# Patient Record
Sex: MALE | Race: WHITE | NOT HISPANIC OR LATINO | Employment: FULL TIME | URBAN - METROPOLITAN AREA
[De-identification: names, ages, dates, MRNs, and addresses within clinical notes are randomized per-mention and may not be internally consistent; named-entity substitution may affect disease eponyms.]

---

## 2022-06-22 ENCOUNTER — OFFICE VISIT (OUTPATIENT)
Dept: FAMILY MEDICINE | Facility: CLINIC | Age: 22
End: 2022-06-22
Payer: COMMERCIAL

## 2022-06-22 VITALS
BODY MASS INDEX: 42.66 KG/M2 | RESPIRATION RATE: 18 BRPM | SYSTOLIC BLOOD PRESSURE: 126 MMHG | OXYGEN SATURATION: 100 % | DIASTOLIC BLOOD PRESSURE: 88 MMHG | HEART RATE: 97 BPM | HEIGHT: 72 IN | WEIGHT: 315 LBS

## 2022-06-22 DIAGNOSIS — J30.2 SEASONAL ALLERGIC RHINITIS, UNSPECIFIED TRIGGER: Primary | ICD-10-CM

## 2022-06-22 PROCEDURE — 3079F PR MOST RECENT DIASTOLIC BLOOD PRESSURE 80-89 MM HG: ICD-10-PCS | Mod: CPTII,,, | Performed by: FAMILY MEDICINE

## 2022-06-22 PROCEDURE — 99203 OFFICE O/P NEW LOW 30 MIN: CPT | Mod: ,,, | Performed by: FAMILY MEDICINE

## 2022-06-22 PROCEDURE — 99203 PR OFFICE/OUTPT VISIT, NEW, LEVL III, 30-44 MIN: ICD-10-PCS | Mod: ,,, | Performed by: FAMILY MEDICINE

## 2022-06-22 PROCEDURE — 3008F PR BODY MASS INDEX (BMI) DOCUMENTED: ICD-10-PCS | Mod: CPTII,,, | Performed by: FAMILY MEDICINE

## 2022-06-22 PROCEDURE — 3074F SYST BP LT 130 MM HG: CPT | Mod: CPTII,,, | Performed by: FAMILY MEDICINE

## 2022-06-22 PROCEDURE — 3008F BODY MASS INDEX DOCD: CPT | Mod: CPTII,,, | Performed by: FAMILY MEDICINE

## 2022-06-22 PROCEDURE — 1160F RVW MEDS BY RX/DR IN RCRD: CPT | Mod: CPTII,,, | Performed by: FAMILY MEDICINE

## 2022-06-22 PROCEDURE — 3079F DIAST BP 80-89 MM HG: CPT | Mod: CPTII,,, | Performed by: FAMILY MEDICINE

## 2022-06-22 PROCEDURE — 1159F PR MEDICATION LIST DOCUMENTED IN MEDICAL RECORD: ICD-10-PCS | Mod: CPTII,,, | Performed by: FAMILY MEDICINE

## 2022-06-22 PROCEDURE — 1160F PR REVIEW ALL MEDS BY PRESCRIBER/CLIN PHARMACIST DOCUMENTED: ICD-10-PCS | Mod: CPTII,,, | Performed by: FAMILY MEDICINE

## 2022-06-22 PROCEDURE — 3074F PR MOST RECENT SYSTOLIC BLOOD PRESSURE < 130 MM HG: ICD-10-PCS | Mod: CPTII,,, | Performed by: FAMILY MEDICINE

## 2022-06-22 PROCEDURE — 1159F MED LIST DOCD IN RCRD: CPT | Mod: CPTII,,, | Performed by: FAMILY MEDICINE

## 2022-06-22 RX ORDER — CETIRIZINE HYDROCHLORIDE 10 MG/1
10 TABLET ORAL DAILY
COMMUNITY
End: 2022-07-21 | Stop reason: SDUPTHER

## 2022-06-22 RX ORDER — FLUTICASONE PROPIONATE 50 MCG
1 SPRAY, SUSPENSION (ML) NASAL DAILY
COMMUNITY

## 2022-06-22 RX ORDER — MONTELUKAST SODIUM 10 MG/1
10 TABLET ORAL NIGHTLY
Qty: 30 TABLET | Refills: 3 | Status: SHIPPED | OUTPATIENT
Start: 2022-06-22 | End: 2022-07-21 | Stop reason: SDUPTHER

## 2022-06-22 RX ORDER — PSEUDOEPHEDRINE HCL 120 MG/1
120 TABLET, FILM COATED, EXTENDED RELEASE ORAL
COMMUNITY

## 2022-06-22 NOTE — PROGRESS NOTES
"Subjective:       Patient ID: Roney Fuller is a 22 y.o. male.    Chief Complaint: Establish Care and allergies chronic      Allergies      Review of Systems   Constitutional: Negative.    HENT: Positive for nasal congestion and rhinorrhea.         Allergies: on medication for "years", worse during spring   Respiratory: Negative.    Cardiovascular: Negative.    Psychiatric/Behavioral: Negative.            Objective:      /88 (BP Location: Left arm, Patient Position: Sitting, BP Method: Large (Manual))   Pulse 97   Resp 18   Ht 6' (1.829 m)   Wt (!) 146.5 kg (323 lb)   SpO2 100%   BMI 43.81 kg/m²    Physical Exam  Constitutional:       General: He is not in acute distress.     Appearance: Normal appearance.   Cardiovascular:      Rate and Rhythm: Normal rate and regular rhythm.   Pulmonary:      Effort: Pulmonary effort is normal.      Breath sounds: Normal breath sounds.   Neurological:      Mental Status: He is alert.   Psychiatric:         Mood and Affect: Mood normal.         Behavior: Behavior normal.         Thought Content: Thought content normal.         Judgment: Judgment normal.             Assessment:       Problem List Items Addressed This Visit        ENT    Seasonal allergic rhinitis - Primary    Relevant Medications    montelukast (SINGULAIR) 10 mg tablet           Plan:     1. Allergic rhinitis  Continue Zyrtec/OTC medication  Rx for singular 10 mg q.p.m.  Monitor  Discussed allergy testing; readdress at next visit  Return    "

## 2022-07-12 DIAGNOSIS — Z13.6 SCREENING FOR CARDIOVASCULAR CONDITION: ICD-10-CM

## 2022-07-12 DIAGNOSIS — Z00.00 WELLNESS EXAMINATION: Primary | ICD-10-CM

## 2022-07-12 DIAGNOSIS — Z11.59 NEED FOR HEPATITIS C SCREENING TEST: ICD-10-CM

## 2022-07-12 DIAGNOSIS — Z11.4 ENCOUNTER FOR SCREENING FOR HIV: ICD-10-CM

## 2022-07-15 ENCOUNTER — LAB VISIT (OUTPATIENT)
Dept: LAB | Facility: HOSPITAL | Age: 22
End: 2022-07-15
Attending: FAMILY MEDICINE
Payer: COMMERCIAL

## 2022-07-15 DIAGNOSIS — Z13.6 SCREENING FOR CARDIOVASCULAR CONDITION: ICD-10-CM

## 2022-07-15 DIAGNOSIS — Z00.00 WELLNESS EXAMINATION: ICD-10-CM

## 2022-07-15 DIAGNOSIS — Z11.59 NEED FOR HEPATITIS C SCREENING TEST: ICD-10-CM

## 2022-07-15 DIAGNOSIS — Z11.4 ENCOUNTER FOR SCREENING FOR HIV: ICD-10-CM

## 2022-07-15 LAB
ALBUMIN SERPL-MCNC: 4.6 GM/DL (ref 3.5–5)
ALBUMIN/GLOB SERPL: 1.5 RATIO (ref 1.1–2)
ALP SERPL-CCNC: 63 UNIT/L (ref 40–150)
ALT SERPL-CCNC: 36 UNIT/L (ref 0–55)
AST SERPL-CCNC: 28 UNIT/L (ref 5–34)
BASOPHILS # BLD AUTO: 0.03 X10(3)/MCL (ref 0–0.2)
BASOPHILS NFR BLD AUTO: 0.4 %
BILIRUBIN DIRECT+TOT PNL SERPL-MCNC: 1.3 MG/DL
BUN SERPL-MCNC: 12 MG/DL (ref 8.9–20.6)
CALCIUM SERPL-MCNC: 9.4 MG/DL (ref 8.4–10.2)
CHLORIDE SERPL-SCNC: 105 MMOL/L (ref 98–107)
CHOLEST SERPL-MCNC: 163 MG/DL
CHOLEST/HDLC SERPL: 5 {RATIO} (ref 0–5)
CO2 SERPL-SCNC: 26 MMOL/L (ref 22–29)
CREAT SERPL-MCNC: 1.15 MG/DL (ref 0.73–1.18)
EOSINOPHIL # BLD AUTO: 0.15 X10(3)/MCL (ref 0–0.9)
EOSINOPHIL NFR BLD AUTO: 1.9 %
ERYTHROCYTE [DISTWIDTH] IN BLOOD BY AUTOMATED COUNT: 12.9 % (ref 11.5–17)
GLOBULIN SER-MCNC: 3.1 GM/DL (ref 2.4–3.5)
GLUCOSE SERPL-MCNC: 102 MG/DL (ref 74–100)
HCT VFR BLD AUTO: 43.2 % (ref 42–52)
HCV AB SERPL QL IA: NONREACTIVE
HDLC SERPL-MCNC: 36 MG/DL (ref 35–60)
HGB BLD-MCNC: 14.8 GM/DL (ref 14–18)
HIV 1+2 AB+HIV1 P24 AG SERPL QL IA: NONREACTIVE
IMM GRANULOCYTES # BLD AUTO: 0.02 X10(3)/MCL (ref 0–0.04)
IMM GRANULOCYTES NFR BLD AUTO: 0.3 %
LDLC SERPL CALC-MCNC: 75 MG/DL (ref 50–140)
LYMPHOCYTES # BLD AUTO: 1.49 X10(3)/MCL (ref 0.6–4.6)
LYMPHOCYTES NFR BLD AUTO: 19.3 %
MCH RBC QN AUTO: 29.6 PG (ref 27–31)
MCHC RBC AUTO-ENTMCNC: 34.3 MG/DL (ref 33–36)
MCV RBC AUTO: 86.4 FL (ref 80–94)
MONOCYTES # BLD AUTO: 0.51 X10(3)/MCL (ref 0.1–1.3)
MONOCYTES NFR BLD AUTO: 6.6 %
NEUTROPHILS # BLD AUTO: 5.5 X10(3)/MCL (ref 2.1–9.2)
NEUTROPHILS NFR BLD AUTO: 71.5 %
NRBC BLD AUTO-RTO: 0 %
PLATELET # BLD AUTO: 330 X10(3)/MCL (ref 130–400)
PMV BLD AUTO: 9.7 FL (ref 7.4–10.4)
POTASSIUM SERPL-SCNC: 3.8 MMOL/L (ref 3.5–5.1)
PROT SERPL-MCNC: 7.7 GM/DL (ref 6.4–8.3)
RBC # BLD AUTO: 5 X10(6)/MCL (ref 4.7–6.1)
SODIUM SERPL-SCNC: 143 MMOL/L (ref 136–145)
TRIGL SERPL-MCNC: 259 MG/DL (ref 34–140)
VLDLC SERPL CALC-MCNC: 52 MG/DL
WBC # SPEC AUTO: 7.7 X10(3)/MCL (ref 4.5–11.5)

## 2022-07-15 PROCEDURE — 80053 COMPREHEN METABOLIC PANEL: CPT

## 2022-07-15 PROCEDURE — 85025 COMPLETE CBC W/AUTO DIFF WBC: CPT

## 2022-07-15 PROCEDURE — 87389 HIV-1 AG W/HIV-1&-2 AB AG IA: CPT

## 2022-07-15 PROCEDURE — 80061 LIPID PANEL: CPT

## 2022-07-15 PROCEDURE — 36415 COLL VENOUS BLD VENIPUNCTURE: CPT

## 2022-07-15 PROCEDURE — 86803 HEPATITIS C AB TEST: CPT

## 2022-07-21 ENCOUNTER — OFFICE VISIT (OUTPATIENT)
Dept: FAMILY MEDICINE | Facility: CLINIC | Age: 22
End: 2022-07-21
Payer: COMMERCIAL

## 2022-07-21 VITALS
WEIGHT: 315 LBS | BODY MASS INDEX: 41.75 KG/M2 | DIASTOLIC BLOOD PRESSURE: 80 MMHG | HEIGHT: 73 IN | SYSTOLIC BLOOD PRESSURE: 128 MMHG

## 2022-07-21 DIAGNOSIS — J30.2 SEASONAL ALLERGIC RHINITIS, UNSPECIFIED TRIGGER: ICD-10-CM

## 2022-07-21 DIAGNOSIS — Z00.00 WELLNESS EXAMINATION: Primary | ICD-10-CM

## 2022-07-21 PROCEDURE — 1159F PR MEDICATION LIST DOCUMENTED IN MEDICAL RECORD: ICD-10-PCS | Mod: CPTII,,, | Performed by: FAMILY MEDICINE

## 2022-07-21 PROCEDURE — 99395 PR PREVENTIVE VISIT,EST,18-39: ICD-10-PCS | Mod: ,,, | Performed by: FAMILY MEDICINE

## 2022-07-21 PROCEDURE — 3008F PR BODY MASS INDEX (BMI) DOCUMENTED: ICD-10-PCS | Mod: CPTII,,, | Performed by: FAMILY MEDICINE

## 2022-07-21 PROCEDURE — 1160F RVW MEDS BY RX/DR IN RCRD: CPT | Mod: CPTII,,, | Performed by: FAMILY MEDICINE

## 2022-07-21 PROCEDURE — 3008F BODY MASS INDEX DOCD: CPT | Mod: CPTII,,, | Performed by: FAMILY MEDICINE

## 2022-07-21 PROCEDURE — 1160F PR REVIEW ALL MEDS BY PRESCRIBER/CLIN PHARMACIST DOCUMENTED: ICD-10-PCS | Mod: CPTII,,, | Performed by: FAMILY MEDICINE

## 2022-07-21 PROCEDURE — 99395 PREV VISIT EST AGE 18-39: CPT | Mod: ,,, | Performed by: FAMILY MEDICINE

## 2022-07-21 PROCEDURE — 3079F PR MOST RECENT DIASTOLIC BLOOD PRESSURE 80-89 MM HG: ICD-10-PCS | Mod: CPTII,,, | Performed by: FAMILY MEDICINE

## 2022-07-21 PROCEDURE — 3074F SYST BP LT 130 MM HG: CPT | Mod: CPTII,,, | Performed by: FAMILY MEDICINE

## 2022-07-21 PROCEDURE — 1159F MED LIST DOCD IN RCRD: CPT | Mod: CPTII,,, | Performed by: FAMILY MEDICINE

## 2022-07-21 PROCEDURE — 3074F PR MOST RECENT SYSTOLIC BLOOD PRESSURE < 130 MM HG: ICD-10-PCS | Mod: CPTII,,, | Performed by: FAMILY MEDICINE

## 2022-07-21 PROCEDURE — 3079F DIAST BP 80-89 MM HG: CPT | Mod: CPTII,,, | Performed by: FAMILY MEDICINE

## 2022-07-21 RX ORDER — MONTELUKAST SODIUM 10 MG/1
10 TABLET ORAL NIGHTLY
Qty: 30 TABLET | Refills: 3 | Status: SHIPPED | OUTPATIENT
Start: 2022-07-21 | End: 2022-11-18

## 2022-07-21 RX ORDER — ACETAMINOPHEN 325 MG/1
325 TABLET ORAL EVERY 6 HOURS PRN
COMMUNITY

## 2022-07-21 RX ORDER — CETIRIZINE HYDROCHLORIDE 10 MG/1
10 TABLET ORAL DAILY
Qty: 30 TABLET | Refills: 3 | Status: SHIPPED | OUTPATIENT
Start: 2022-07-21 | End: 2022-11-29

## 2022-07-21 NOTE — PROGRESS NOTES
"Subjective:       Patient ID: Roney Fuller is a 22 y.o. male.    Chief Complaint: Follow-up (Follow up wellness /Room 3)      Wellness      Review of Systems   Constitutional: Negative.  Negative for chills and fever.   HENT: Positive for nasal congestion (reports medication no longer working as well) and sinus pressure/congestion. Negative for rhinorrhea.    Respiratory: Negative.    Cardiovascular: Negative.    Gastrointestinal: Negative.    Genitourinary: Negative.    Musculoskeletal: Negative.    Integumentary:  Negative.   Neurological: Negative.    Hematological: Negative.    Psychiatric/Behavioral: Negative.            Objective:      /80 (BP Location: Left arm, Patient Position: Sitting, BP Method: Large (Manual))   Ht 6' 1" (1.854 m)   Wt (!) 148.8 kg (328 lb)   BMI 43.27 kg/m²    Physical Exam  Constitutional:       General: He is not in acute distress.     Appearance: Normal appearance.   Cardiovascular:      Rate and Rhythm: Normal rate and regular rhythm.   Pulmonary:      Effort: Pulmonary effort is normal.      Breath sounds: Normal breath sounds.   Abdominal:      General: Abdomen is flat. Bowel sounds are normal.      Palpations: Abdomen is soft.   Musculoskeletal:         General: Normal range of motion.   Neurological:      General: No focal deficit present.      Mental Status: He is alert and oriented to person, place, and time.   Psychiatric:         Mood and Affect: Mood normal.         Behavior: Behavior normal.         Thought Content: Thought content normal.         Judgment: Judgment normal.           Recent Results (from the past 504 hour(s))   Comprehensive Metabolic Panel    Collection Time: 07/15/22  1:13 PM   Result Value Ref Range    Sodium Level 143 136 - 145 mmol/L    Potassium Level 3.8 3.5 - 5.1 mmol/L    Chloride 105 98 - 107 mmol/L    Carbon Dioxide 26 22 - 29 mmol/L    Glucose Level 102 (H) 74 - 100 mg/dL    Blood Urea Nitrogen 12.0 8.9 - 20.6 mg/dL    Creatinine " 1.15 0.73 - 1.18 mg/dL    Calcium Level Total 9.4 8.4 - 10.2 mg/dL    Protein Total 7.7 6.4 - 8.3 gm/dL    Albumin Level 4.6 3.5 - 5.0 gm/dL    Globulin 3.1 2.4 - 3.5 gm/dL    Albumin/Globulin Ratio 1.5 1.1 - 2.0 ratio    Bilirubin Total 1.3 <=1.5 mg/dL    Alkaline Phosphatase 63 40 - 150 unit/L    Alanine Aminotransferase 36 0 - 55 unit/L    Aspartate Aminotransferase 28 5 - 34 unit/L    Estimated GFR-Non  >60 mls/min/1.73/m2   Hepatitis C Antibody    Collection Time: 07/15/22  1:13 PM   Result Value Ref Range    Hepatitis C Antibody Nonreactive Nonreactive   HIV 1/2 Ag/Ab (4th Gen)    Collection Time: 07/15/22  1:13 PM   Result Value Ref Range    HIV Nonreactive Nonreactive   Lipid Panel    Collection Time: 07/15/22  1:13 PM   Result Value Ref Range    Cholesterol Total 163 <=200 mg/dL    HDL Cholesterol 36 35 - 60 mg/dL    Triglyceride 259 (H) 34 - 140 mg/dL    Cholesterol/HDL Ratio 5 0 - 5    Very Low Density Lipoprotein 52     LDL Cholesterol 75.00 50.00 - 140.00 mg/dL   CBC with Differential    Collection Time: 07/15/22  1:13 PM   Result Value Ref Range    WBC 7.7 4.5 - 11.5 x10(3)/mcL    RBC 5.00 4.70 - 6.10 x10(6)/mcL    Hgb 14.8 14.0 - 18.0 gm/dL    Hct 43.2 42.0 - 52.0 %    MCV 86.4 80.0 - 94.0 fL    MCH 29.6 27.0 - 31.0 pg    MCHC 34.3 33.0 - 36.0 mg/dL    RDW 12.9 11.5 - 17.0 %    Platelet 330 130 - 400 x10(3)/mcL    MPV 9.7 7.4 - 10.4 fL    Neut % 71.5 %    Lymph % 19.3 %    Mono % 6.6 %    Eos % 1.9 %    Basophil % 0.4 %    Lymph # 1.49 0.6 - 4.6 x10(3)/mcL    Neut # 5.5 2.1 - 9.2 x10(3)/mcL    Mono # 0.51 0.1 - 1.3 x10(3)/mcL    Eos # 0.15 0 - 0.9 x10(3)/mcL    Baso # 0.03 0 - 0.2 x10(3)/mcL    IG# 0.02 0 - 0.04 x10(3)/mcL    IG% 0.3 %    NRBC% 0.0 %        Assessment:       Problem List Items Addressed This Visit        ENT    Seasonal allergic rhinitis    Relevant Medications    montelukast (SINGULAIR) 10 mg tablet    cetirizine (ZYRTEC) 10 MG tablet    Other Relevant Orders     Ambulatory referral/consult to ENT      Other Visit Diagnoses     Wellness examination    -  Primary           Plan:     1. Wellness  Lab work discussed with patient; diet modification she given to patient  Continue current medication  Continue diet/exercise  Refer patient to Dr. Gallagher for allergy workup  Return to clinic with any concerns

## 2023-01-20 ENCOUNTER — OFFICE VISIT (OUTPATIENT)
Dept: FAMILY MEDICINE | Facility: CLINIC | Age: 23
End: 2023-01-20
Payer: COMMERCIAL

## 2023-01-20 VITALS
BODY MASS INDEX: 41.75 KG/M2 | OXYGEN SATURATION: 100 % | HEART RATE: 88 BPM | WEIGHT: 315 LBS | SYSTOLIC BLOOD PRESSURE: 152 MMHG | HEIGHT: 73 IN | DIASTOLIC BLOOD PRESSURE: 100 MMHG | RESPIRATION RATE: 18 BRPM | TEMPERATURE: 97 F

## 2023-01-20 DIAGNOSIS — I10 HYPERTENSION, UNSPECIFIED TYPE: Primary | ICD-10-CM

## 2023-01-20 PROCEDURE — 3008F PR BODY MASS INDEX (BMI) DOCUMENTED: ICD-10-PCS | Mod: CPTII,,, | Performed by: FAMILY MEDICINE

## 2023-01-20 PROCEDURE — 3080F DIAST BP >= 90 MM HG: CPT | Mod: CPTII,,, | Performed by: FAMILY MEDICINE

## 2023-01-20 PROCEDURE — 99214 PR OFFICE/OUTPT VISIT, EST, LEVL IV, 30-39 MIN: ICD-10-PCS | Mod: ,,, | Performed by: FAMILY MEDICINE

## 2023-01-20 PROCEDURE — 1160F RVW MEDS BY RX/DR IN RCRD: CPT | Mod: CPTII,,, | Performed by: FAMILY MEDICINE

## 2023-01-20 PROCEDURE — 1159F PR MEDICATION LIST DOCUMENTED IN MEDICAL RECORD: ICD-10-PCS | Mod: CPTII,,, | Performed by: FAMILY MEDICINE

## 2023-01-20 PROCEDURE — 3008F BODY MASS INDEX DOCD: CPT | Mod: CPTII,,, | Performed by: FAMILY MEDICINE

## 2023-01-20 PROCEDURE — 3077F SYST BP >= 140 MM HG: CPT | Mod: CPTII,,, | Performed by: FAMILY MEDICINE

## 2023-01-20 PROCEDURE — 3077F PR MOST RECENT SYSTOLIC BLOOD PRESSURE >= 140 MM HG: ICD-10-PCS | Mod: CPTII,,, | Performed by: FAMILY MEDICINE

## 2023-01-20 PROCEDURE — 1159F MED LIST DOCD IN RCRD: CPT | Mod: CPTII,,, | Performed by: FAMILY MEDICINE

## 2023-01-20 PROCEDURE — 1160F PR REVIEW ALL MEDS BY PRESCRIBER/CLIN PHARMACIST DOCUMENTED: ICD-10-PCS | Mod: CPTII,,, | Performed by: FAMILY MEDICINE

## 2023-01-20 PROCEDURE — 99214 OFFICE O/P EST MOD 30 MIN: CPT | Mod: ,,, | Performed by: FAMILY MEDICINE

## 2023-01-20 PROCEDURE — 3080F PR MOST RECENT DIASTOLIC BLOOD PRESSURE >= 90 MM HG: ICD-10-PCS | Mod: CPTII,,, | Performed by: FAMILY MEDICINE

## 2023-01-20 RX ORDER — HYDROCHLOROTHIAZIDE 12.5 MG/1
12.5 TABLET ORAL DAILY
Qty: 30 TABLET | Refills: 1 | Status: SHIPPED | OUTPATIENT
Start: 2023-01-20 | End: 2023-02-02 | Stop reason: SDUPTHER

## 2023-01-20 NOTE — PROGRESS NOTES
"Subjective:       Patient ID: Roney Fuller is a 22 y.o. male.    Chief Complaint: Hypertension      Elevated BP  Scheduled for sinus surgery with Dr Gallagher earlier today. Noted to have elevated BP at pre-op. Reports headaches    Review of Systems   Constitutional: Negative.    Respiratory: Negative.     Cardiovascular: Negative.  Negative for chest pain and leg swelling.   Neurological:  Positive for headaches.         Objective:      BP (!) 152/100 (BP Location: Left arm, Patient Position: Sitting, BP Method: Large (Manual))   Pulse 88   Temp 97.1 °F (36.2 °C)   Resp 18   Ht 6' 1" (1.854 m)   Wt (!) 149.7 kg (330 lb)   SpO2 100%   BMI 43.54 kg/m²    Physical Exam  Constitutional:       Appearance: Normal appearance.   Cardiovascular:      Rate and Rhythm: Normal rate and regular rhythm.      Heart sounds: Normal heart sounds.   Pulmonary:      Effort: Pulmonary effort is normal.      Breath sounds: Normal breath sounds.   Neurological:      Mental Status: He is alert.   Psychiatric:         Mood and Affect: Mood normal.         Behavior: Behavior normal.         Thought Content: Thought content normal.         Judgment: Judgment normal.           Assessment:       Problem List Items Addressed This Visit    None  Visit Diagnoses       Hypertension, unspecified type    -  Primary    Relevant Medications    hydroCHLOROthiazide (HYDRODIURIL) 12.5 MG Tab               Plan:   1. Hypertension, unspecified type  -     hydroCHLOROthiazide (HYDRODIURIL) 12.5 MG Tab; Take 1 tablet (12.5 mg total) by mouth once daily.  Dispense: 30 tablet; Refill: 1  Start HCTZ 12.5 mg q.day   Monitor   discussed decrease sodium intake   Bring log to next visit   Return to clinic with any concerns      "

## 2023-02-02 ENCOUNTER — OFFICE VISIT (OUTPATIENT)
Dept: FAMILY MEDICINE | Facility: CLINIC | Age: 23
End: 2023-02-02
Payer: COMMERCIAL

## 2023-02-02 VITALS
WEIGHT: 315 LBS | SYSTOLIC BLOOD PRESSURE: 138 MMHG | HEIGHT: 73 IN | HEART RATE: 95 BPM | BODY MASS INDEX: 41.75 KG/M2 | OXYGEN SATURATION: 100 % | DIASTOLIC BLOOD PRESSURE: 88 MMHG | RESPIRATION RATE: 18 BRPM | TEMPERATURE: 98 F

## 2023-02-02 DIAGNOSIS — I10 HYPERTENSION, UNSPECIFIED TYPE: Primary | ICD-10-CM

## 2023-02-02 PROCEDURE — 3008F PR BODY MASS INDEX (BMI) DOCUMENTED: ICD-10-PCS | Mod: CPTII,,, | Performed by: FAMILY MEDICINE

## 2023-02-02 PROCEDURE — 3079F PR MOST RECENT DIASTOLIC BLOOD PRESSURE 80-89 MM HG: ICD-10-PCS | Mod: CPTII,,, | Performed by: FAMILY MEDICINE

## 2023-02-02 PROCEDURE — 99213 OFFICE O/P EST LOW 20 MIN: CPT | Mod: ,,, | Performed by: FAMILY MEDICINE

## 2023-02-02 PROCEDURE — 99213 PR OFFICE/OUTPT VISIT, EST, LEVL III, 20-29 MIN: ICD-10-PCS | Mod: ,,, | Performed by: FAMILY MEDICINE

## 2023-02-02 PROCEDURE — 1160F PR REVIEW ALL MEDS BY PRESCRIBER/CLIN PHARMACIST DOCUMENTED: ICD-10-PCS | Mod: CPTII,,, | Performed by: FAMILY MEDICINE

## 2023-02-02 PROCEDURE — 1159F MED LIST DOCD IN RCRD: CPT | Mod: CPTII,,, | Performed by: FAMILY MEDICINE

## 2023-02-02 PROCEDURE — 1160F RVW MEDS BY RX/DR IN RCRD: CPT | Mod: CPTII,,, | Performed by: FAMILY MEDICINE

## 2023-02-02 PROCEDURE — 3075F PR MOST RECENT SYSTOLIC BLOOD PRESS GE 130-139MM HG: ICD-10-PCS | Mod: CPTII,,, | Performed by: FAMILY MEDICINE

## 2023-02-02 PROCEDURE — 1159F PR MEDICATION LIST DOCUMENTED IN MEDICAL RECORD: ICD-10-PCS | Mod: CPTII,,, | Performed by: FAMILY MEDICINE

## 2023-02-02 PROCEDURE — 3008F BODY MASS INDEX DOCD: CPT | Mod: CPTII,,, | Performed by: FAMILY MEDICINE

## 2023-02-02 PROCEDURE — 3075F SYST BP GE 130 - 139MM HG: CPT | Mod: CPTII,,, | Performed by: FAMILY MEDICINE

## 2023-02-02 PROCEDURE — 3079F DIAST BP 80-89 MM HG: CPT | Mod: CPTII,,, | Performed by: FAMILY MEDICINE

## 2023-02-02 RX ORDER — HYDROCHLOROTHIAZIDE 25 MG/1
25 TABLET ORAL DAILY
Qty: 90 TABLET | Refills: 1 | Status: SHIPPED | OUTPATIENT
Start: 2023-02-02 | End: 2023-08-04

## 2023-02-02 NOTE — PROGRESS NOTES
"Subjective:       Patient ID: Roney Fuller is a 22 y.o. male.    Chief Complaint: sx clearance, 2 week follow up      Follow-up    Hypertension  - tolerating medication (no side effects)  - no headaches  - patient without any complaints    Review of Systems   Constitutional: Negative.    HENT:          Scheduled for surgery with Dr Gallagher   Respiratory: Negative.     Cardiovascular: Negative.    Neurological:  Negative for dizziness and headaches.         Objective:      /88 (BP Location: Left arm, Patient Position: Sitting, BP Method: Large (Manual))   Pulse 95   Temp 97.8 °F (36.6 °C)   Resp 18   Ht 6' 1" (1.854 m)   Wt (!) 151 kg (333 lb)   SpO2 100%   BMI 43.93 kg/m²    Physical Exam  Constitutional:       Appearance: Normal appearance.   Cardiovascular:      Rate and Rhythm: Normal rate and regular rhythm.      Heart sounds: Normal heart sounds.   Pulmonary:      Effort: Pulmonary effort is normal.      Breath sounds: Normal breath sounds.   Neurological:      Mental Status: He is alert.   Psychiatric:         Mood and Affect: Mood normal.         Behavior: Behavior normal.         Thought Content: Thought content normal.         Judgment: Judgment normal.           Assessment:       Problem List Items Addressed This Visit    None  Visit Diagnoses       Hypertension, unspecified type    -  Primary    Relevant Medications    hydroCHLOROthiazide (HYDRODIURIL) 25 MG tablet               Plan:   1. Hypertension, unspecified type  -     hydroCHLOROthiazide (HYDRODIURIL) 25 MG tablet; Take 1 tablet (25 mg total) by mouth once daily.  Dispense: 90 tablet; Refill: 1  Controlled  Continue current Rx medication  Return to clinic with any concerns  Keep scheduled appointment with Dr Gallagher  Patient cleared for surgery       "

## 2023-08-02 DIAGNOSIS — Z13.6 SCREENING FOR ISCHEMIC HEART DISEASE: ICD-10-CM

## 2023-08-02 DIAGNOSIS — Z11.4 ENCOUNTER FOR HIV (HUMAN IMMUNODEFICIENCY VIRUS) TEST: Primary | ICD-10-CM

## 2023-08-02 DIAGNOSIS — Z00.00 WELLNESS EXAMINATION: ICD-10-CM

## 2023-08-02 DIAGNOSIS — Z11.59 NEED FOR HEPATITIS C SCREENING TEST: ICD-10-CM

## 2023-08-25 ENCOUNTER — LAB VISIT (OUTPATIENT)
Dept: LAB | Facility: HOSPITAL | Age: 23
End: 2023-08-25
Attending: FAMILY MEDICINE
Payer: COMMERCIAL

## 2023-08-25 DIAGNOSIS — Z13.6 SCREENING FOR ISCHEMIC HEART DISEASE: ICD-10-CM

## 2023-08-25 DIAGNOSIS — Z11.59 NEED FOR HEPATITIS C SCREENING TEST: ICD-10-CM

## 2023-08-25 DIAGNOSIS — Z11.4 ENCOUNTER FOR HIV (HUMAN IMMUNODEFICIENCY VIRUS) TEST: ICD-10-CM

## 2023-08-25 DIAGNOSIS — Z00.00 WELLNESS EXAMINATION: ICD-10-CM

## 2023-08-25 LAB
ALBUMIN SERPL-MCNC: 4.1 G/DL (ref 3.5–5)
ALBUMIN/GLOB SERPL: 1.2 RATIO (ref 1.1–2)
ALP SERPL-CCNC: 53 UNIT/L (ref 40–150)
ALT SERPL-CCNC: 21 UNIT/L (ref 0–55)
AST SERPL-CCNC: 17 UNIT/L (ref 5–34)
BASOPHILS # BLD AUTO: 0.09 X10(3)/MCL
BASOPHILS NFR BLD AUTO: 1.4 %
BILIRUB SERPL-MCNC: 0.8 MG/DL
BUN SERPL-MCNC: 17 MG/DL (ref 8.9–20.6)
CALCIUM SERPL-MCNC: 9.5 MG/DL (ref 8.4–10.2)
CHLORIDE SERPL-SCNC: 108 MMOL/L (ref 98–107)
CHOLEST SERPL-MCNC: 185 MG/DL
CHOLEST/HDLC SERPL: 5 {RATIO} (ref 0–5)
CO2 SERPL-SCNC: 26 MMOL/L (ref 22–29)
CREAT SERPL-MCNC: 0.98 MG/DL (ref 0.73–1.18)
EOSINOPHIL # BLD AUTO: 0.24 X10(3)/MCL (ref 0–0.9)
EOSINOPHIL NFR BLD AUTO: 3.8 %
ERYTHROCYTE [DISTWIDTH] IN BLOOD BY AUTOMATED COUNT: 12.9 % (ref 11.5–17)
GFR SERPLBLD CREATININE-BSD FMLA CKD-EPI: >60 MLS/MIN/1.73/M2
GLOBULIN SER-MCNC: 3.3 GM/DL (ref 2.4–3.5)
GLUCOSE SERPL-MCNC: 102 MG/DL (ref 74–100)
HCT VFR BLD AUTO: 45.4 % (ref 42–52)
HCV AB SERPL QL IA: NONREACTIVE
HDLC SERPL-MCNC: 40 MG/DL (ref 35–60)
HGB BLD-MCNC: 15.1 G/DL (ref 14–18)
HIV 1+2 AB+HIV1 P24 AG SERPL QL IA: NONREACTIVE
IMM GRANULOCYTES # BLD AUTO: 0.02 X10(3)/MCL (ref 0–0.04)
IMM GRANULOCYTES NFR BLD AUTO: 0.3 %
LDLC SERPL CALC-MCNC: 112 MG/DL (ref 50–140)
LYMPHOCYTES # BLD AUTO: 1.95 X10(3)/MCL (ref 0.6–4.6)
LYMPHOCYTES NFR BLD AUTO: 31 %
MCH RBC QN AUTO: 29.5 PG (ref 27–31)
MCHC RBC AUTO-ENTMCNC: 33.3 G/DL (ref 33–36)
MCV RBC AUTO: 88.7 FL (ref 80–94)
MONOCYTES # BLD AUTO: 0.62 X10(3)/MCL (ref 0.1–1.3)
MONOCYTES NFR BLD AUTO: 9.8 %
NEUTROPHILS # BLD AUTO: 3.38 X10(3)/MCL (ref 2.1–9.2)
NEUTROPHILS NFR BLD AUTO: 53.7 %
NRBC BLD AUTO-RTO: 0 %
PLATELET # BLD AUTO: 330 X10(3)/MCL (ref 130–400)
PMV BLD AUTO: 9.5 FL (ref 7.4–10.4)
POTASSIUM SERPL-SCNC: 4.1 MMOL/L (ref 3.5–5.1)
PROT SERPL-MCNC: 7.4 GM/DL (ref 6.4–8.3)
RBC # BLD AUTO: 5.12 X10(6)/MCL (ref 4.7–6.1)
SODIUM SERPL-SCNC: 143 MMOL/L (ref 136–145)
TRIGL SERPL-MCNC: 167 MG/DL (ref 34–140)
VLDLC SERPL CALC-MCNC: 33 MG/DL
WBC # SPEC AUTO: 6.3 X10(3)/MCL (ref 4.5–11.5)

## 2023-08-25 PROCEDURE — 85025 COMPLETE CBC W/AUTO DIFF WBC: CPT

## 2023-08-25 PROCEDURE — 86803 HEPATITIS C AB TEST: CPT

## 2023-08-25 PROCEDURE — 80061 LIPID PANEL: CPT

## 2023-08-25 PROCEDURE — 80053 COMPREHEN METABOLIC PANEL: CPT

## 2023-08-25 PROCEDURE — 36415 COLL VENOUS BLD VENIPUNCTURE: CPT

## 2023-08-25 PROCEDURE — 87389 HIV-1 AG W/HIV-1&-2 AB AG IA: CPT

## 2023-08-30 ENCOUNTER — OFFICE VISIT (OUTPATIENT)
Dept: FAMILY MEDICINE | Facility: CLINIC | Age: 23
End: 2023-08-30
Payer: COMMERCIAL

## 2023-08-30 VITALS
RESPIRATION RATE: 18 BRPM | WEIGHT: 315 LBS | HEIGHT: 73 IN | SYSTOLIC BLOOD PRESSURE: 138 MMHG | HEART RATE: 110 BPM | OXYGEN SATURATION: 98 % | DIASTOLIC BLOOD PRESSURE: 72 MMHG | TEMPERATURE: 97 F | BODY MASS INDEX: 41.75 KG/M2

## 2023-08-30 DIAGNOSIS — Z00.00 WELLNESS EXAMINATION: Primary | ICD-10-CM

## 2023-08-30 DIAGNOSIS — E66.01 CLASS 3 OBESITY: ICD-10-CM

## 2023-08-30 PROBLEM — E66.813 CLASS 3 OBESITY: Status: ACTIVE | Noted: 2023-08-30

## 2023-08-30 PROCEDURE — 3008F PR BODY MASS INDEX (BMI) DOCUMENTED: ICD-10-PCS | Mod: CPTII,,, | Performed by: FAMILY MEDICINE

## 2023-08-30 PROCEDURE — 1159F PR MEDICATION LIST DOCUMENTED IN MEDICAL RECORD: ICD-10-PCS | Mod: CPTII,,, | Performed by: FAMILY MEDICINE

## 2023-08-30 PROCEDURE — 99395 PREV VISIT EST AGE 18-39: CPT | Mod: ,,, | Performed by: FAMILY MEDICINE

## 2023-08-30 PROCEDURE — 3078F DIAST BP <80 MM HG: CPT | Mod: CPTII,,, | Performed by: FAMILY MEDICINE

## 2023-08-30 PROCEDURE — 99395 PR PREVENTIVE VISIT,EST,18-39: ICD-10-PCS | Mod: ,,, | Performed by: FAMILY MEDICINE

## 2023-08-30 PROCEDURE — 1160F RVW MEDS BY RX/DR IN RCRD: CPT | Mod: CPTII,,, | Performed by: FAMILY MEDICINE

## 2023-08-30 PROCEDURE — 1160F PR REVIEW ALL MEDS BY PRESCRIBER/CLIN PHARMACIST DOCUMENTED: ICD-10-PCS | Mod: CPTII,,, | Performed by: FAMILY MEDICINE

## 2023-08-30 PROCEDURE — 3075F SYST BP GE 130 - 139MM HG: CPT | Mod: CPTII,,, | Performed by: FAMILY MEDICINE

## 2023-08-30 PROCEDURE — 3075F PR MOST RECENT SYSTOLIC BLOOD PRESS GE 130-139MM HG: ICD-10-PCS | Mod: CPTII,,, | Performed by: FAMILY MEDICINE

## 2023-08-30 PROCEDURE — 3008F BODY MASS INDEX DOCD: CPT | Mod: CPTII,,, | Performed by: FAMILY MEDICINE

## 2023-08-30 PROCEDURE — 3078F PR MOST RECENT DIASTOLIC BLOOD PRESSURE < 80 MM HG: ICD-10-PCS | Mod: CPTII,,, | Performed by: FAMILY MEDICINE

## 2023-08-30 PROCEDURE — 1159F MED LIST DOCD IN RCRD: CPT | Mod: CPTII,,, | Performed by: FAMILY MEDICINE

## 2023-08-30 RX ORDER — PHENTERMINE HYDROCHLORIDE 37.5 MG/1
37.5 TABLET ORAL
Qty: 30 TABLET | Refills: 0 | Status: SHIPPED | OUTPATIENT
Start: 2023-08-30 | End: 2023-10-04 | Stop reason: SDUPTHER

## 2023-08-30 NOTE — PROGRESS NOTES
"Subjective:       Patient ID: Roney Fuller is a 23 y.o. male.    Chief Complaint: Wellness; sinus drip      Wellness        Review of Systems   Constitutional: Negative.    HENT:  Positive for postnasal drip.    Respiratory: Negative.     Cardiovascular: Negative.    Gastrointestinal: Negative.    Psychiatric/Behavioral: Negative.             Objective:      /72 (BP Location: Left arm, Patient Position: Sitting, BP Method: X-Large (Manual))   Pulse 110   Temp 97.2 °F (36.2 °C)   Resp 18   Ht 6' 1" (1.854 m)   Wt (!) 156.5 kg (345 lb)   SpO2 98%   BMI 45.52 kg/m²    Physical Exam  Constitutional:       Appearance: Normal appearance.   Cardiovascular:      Rate and Rhythm: Normal rate and regular rhythm.   Pulmonary:      Effort: Pulmonary effort is normal.      Breath sounds: Normal breath sounds.   Abdominal:      General: Abdomen is flat. Bowel sounds are normal.      Palpations: Abdomen is soft.   Neurological:      Mental Status: He is alert.   Psychiatric:         Mood and Affect: Mood normal.         Behavior: Behavior normal.         Thought Content: Thought content normal.         Judgment: Judgment normal.             Recent Results (from the past 504 hour(s))   Hepatitis C Antibody    Collection Time: 08/25/23  6:57 AM   Result Value Ref Range    Hep C Ab Interp Nonreactive Nonreactive   Comprehensive Metabolic Panel    Collection Time: 08/25/23  6:57 AM   Result Value Ref Range    Sodium Level 143 136 - 145 mmol/L    Potassium Level 4.1 3.5 - 5.1 mmol/L    Chloride 108 (H) 98 - 107 mmol/L    Carbon Dioxide 26 22 - 29 mmol/L    Glucose Level 102 (H) 74 - 100 mg/dL    Blood Urea Nitrogen 17.0 8.9 - 20.6 mg/dL    Creatinine 0.98 0.73 - 1.18 mg/dL    Calcium Level Total 9.5 8.4 - 10.2 mg/dL    Protein Total 7.4 6.4 - 8.3 gm/dL    Albumin Level 4.1 3.5 - 5.0 g/dL    Globulin 3.3 2.4 - 3.5 gm/dL    Albumin/Globulin Ratio 1.2 1.1 - 2.0 ratio    Bilirubin Total 0.8 <=1.5 mg/dL    Alkaline " Phosphatase 53 40 - 150 unit/L    Alanine Aminotransferase 21 0 - 55 unit/L    Aspartate Aminotransferase 17 5 - 34 unit/L    eGFR >60 mls/min/1.73/m2   Lipid Panel    Collection Time: 08/25/23  6:57 AM   Result Value Ref Range    Cholesterol Total 185 <=200 mg/dL    HDL Cholesterol 40 35 - 60 mg/dL    Triglyceride 167 (H) 34 - 140 mg/dL    Cholesterol/HDL Ratio 5 0 - 5    Very Low Density Lipoprotein 33     LDL Cholesterol 112.00 50.00 - 140.00 mg/dL   HIV 1/2 Ag/Ab (4th Gen)    Collection Time: 08/25/23  6:57 AM   Result Value Ref Range    HIV Nonreactive Nonreactive   CBC with Differential    Collection Time: 08/25/23  6:57 AM   Result Value Ref Range    WBC 6.30 4.50 - 11.50 x10(3)/mcL    RBC 5.12 4.70 - 6.10 x10(6)/mcL    Hgb 15.1 14.0 - 18.0 g/dL    Hct 45.4 42.0 - 52.0 %    MCV 88.7 80.0 - 94.0 fL    MCH 29.5 27.0 - 31.0 pg    MCHC 33.3 33.0 - 36.0 g/dL    RDW 12.9 11.5 - 17.0 %    Platelet 330 130 - 400 x10(3)/mcL    MPV 9.5 7.4 - 10.4 fL    Neut % 53.7 %    Lymph % 31.0 %    Mono % 9.8 %    Eos % 3.8 %    Basophil % 1.4 %    Lymph # 1.95 0.6 - 4.6 x10(3)/mcL    Neut # 3.38 2.1 - 9.2 x10(3)/mcL    Mono # 0.62 0.1 - 1.3 x10(3)/mcL    Eos # 0.24 0 - 0.9 x10(3)/mcL    Baso # 0.09 <=0.2 x10(3)/mcL    IG# 0.02 0 - 0.04 x10(3)/mcL    IG% 0.3 %    NRBC% 0.0 %        Assessment:       Problem List Items Addressed This Visit          Endocrine    Class 3 obesity    Current Assessment & Plan     Reduced calorie diet modification  Frequent self weighing   Exercise/lifestyle modification         Relevant Medications    phentermine (ADIPEX-P) 37.5 mg tablet    Other Relevant Orders    Ambulatory referral/consult to Nutrition Services     Other Visit Diagnoses       Wellness examination    -  Primary               Plan:   1. Wellness examination  Lab work discussed with patient  Continue current medication  Continue diet/exercise  Return to clinic with any concerns    2. Class 3 obesity  Assessment & Plan:  Reduced  calorie diet modification  Frequent self weighing   Exercise/lifestyle modification  Refer patient to dietitian   Rx for Adipex     Orders:  -     Ambulatory referral/consult to Nutrition Services; Future; Expected date: 09/06/2023  -     phentermine (ADIPEX-P) 37.5 mg tablet; Take 1 tablet (37.5 mg total) by mouth before breakfast.  Dispense: 30 tablet; Refill: 0

## 2023-10-04 ENCOUNTER — OFFICE VISIT (OUTPATIENT)
Dept: FAMILY MEDICINE | Facility: CLINIC | Age: 23
End: 2023-10-04
Payer: COMMERCIAL

## 2023-10-04 VITALS
OXYGEN SATURATION: 98 % | HEART RATE: 108 BPM | DIASTOLIC BLOOD PRESSURE: 86 MMHG | BODY MASS INDEX: 41.75 KG/M2 | TEMPERATURE: 98 F | SYSTOLIC BLOOD PRESSURE: 134 MMHG | HEIGHT: 73 IN | WEIGHT: 315 LBS | RESPIRATION RATE: 18 BRPM

## 2023-10-04 DIAGNOSIS — E66.01 CLASS 3 OBESITY: Primary | ICD-10-CM

## 2023-10-04 PROCEDURE — 3079F DIAST BP 80-89 MM HG: CPT | Mod: CPTII,,, | Performed by: FAMILY MEDICINE

## 2023-10-04 PROCEDURE — 1160F PR REVIEW ALL MEDS BY PRESCRIBER/CLIN PHARMACIST DOCUMENTED: ICD-10-PCS | Mod: CPTII,,, | Performed by: FAMILY MEDICINE

## 2023-10-04 PROCEDURE — 3008F BODY MASS INDEX DOCD: CPT | Mod: CPTII,,, | Performed by: FAMILY MEDICINE

## 2023-10-04 PROCEDURE — 1159F PR MEDICATION LIST DOCUMENTED IN MEDICAL RECORD: ICD-10-PCS | Mod: CPTII,,, | Performed by: FAMILY MEDICINE

## 2023-10-04 PROCEDURE — 99214 PR OFFICE/OUTPT VISIT, EST, LEVL IV, 30-39 MIN: ICD-10-PCS | Mod: ,,, | Performed by: FAMILY MEDICINE

## 2023-10-04 PROCEDURE — 3079F PR MOST RECENT DIASTOLIC BLOOD PRESSURE 80-89 MM HG: ICD-10-PCS | Mod: CPTII,,, | Performed by: FAMILY MEDICINE

## 2023-10-04 PROCEDURE — 3075F SYST BP GE 130 - 139MM HG: CPT | Mod: CPTII,,, | Performed by: FAMILY MEDICINE

## 2023-10-04 PROCEDURE — 3008F PR BODY MASS INDEX (BMI) DOCUMENTED: ICD-10-PCS | Mod: CPTII,,, | Performed by: FAMILY MEDICINE

## 2023-10-04 PROCEDURE — 1160F RVW MEDS BY RX/DR IN RCRD: CPT | Mod: CPTII,,, | Performed by: FAMILY MEDICINE

## 2023-10-04 PROCEDURE — 3075F PR MOST RECENT SYSTOLIC BLOOD PRESS GE 130-139MM HG: ICD-10-PCS | Mod: CPTII,,, | Performed by: FAMILY MEDICINE

## 2023-10-04 PROCEDURE — 1159F MED LIST DOCD IN RCRD: CPT | Mod: CPTII,,, | Performed by: FAMILY MEDICINE

## 2023-10-04 PROCEDURE — 99214 OFFICE O/P EST MOD 30 MIN: CPT | Mod: ,,, | Performed by: FAMILY MEDICINE

## 2023-10-04 RX ORDER — FLUNISOLIDE 0.25 MG/ML
2 SOLUTION NASAL
COMMUNITY
Start: 2023-09-20

## 2023-10-04 RX ORDER — PHENTERMINE HYDROCHLORIDE 37.5 MG/1
37.5 TABLET ORAL
Qty: 30 TABLET | Refills: 0 | Status: SHIPPED | OUTPATIENT
Start: 2023-10-04 | End: 2023-11-03

## 2023-10-04 NOTE — PROGRESS NOTES
"Subjective:       Patient ID: Roney Fuller is a 23 y.o. male.    Chief Complaint: 1 month follow up      Follow-up        Review of Systems   Constitutional: Negative.         17 lb weight loss, reports increased exercise   Respiratory: Negative.     Cardiovascular: Negative.    Gastrointestinal: Negative.    Psychiatric/Behavioral: Negative.             Objective:      /86 (BP Location: Left arm, Patient Position: Sitting, BP Method: Large (Manual))   Pulse 108   Temp 97.5 °F (36.4 °C)   Resp 18   Ht 6' 1" (1.854 m)   Wt (!) 148.8 kg (328 lb)   SpO2 98%   BMI 43.27 kg/m²    Physical Exam  Constitutional:       Appearance: Normal appearance.   Cardiovascular:      Rate and Rhythm: Normal rate and regular rhythm.      Heart sounds: Normal heart sounds.   Pulmonary:      Effort: Pulmonary effort is normal.      Breath sounds: Normal breath sounds.   Neurological:      Mental Status: He is alert.   Psychiatric:         Mood and Affect: Mood normal.         Behavior: Behavior normal.         Thought Content: Thought content normal.         Judgment: Judgment normal.               Assessment:       Problem List Items Addressed This Visit          Endocrine    Class 3 obesity - Primary    Relevant Medications    phentermine (ADIPEX-P) 37.5 mg tablet          Plan:   1. Class 3 obesity  -     phentermine (ADIPEX-P) 37.5 mg tablet; Take 1 tablet (37.5 mg total) by mouth before breakfast.  Dispense: 30 tablet; Refill: 0  Continue reduced calorie diet modification  Frequent self weighing   Continue exercise/lifestyle modification  Return to clinic with any concerns            "

## 2023-10-14 ENCOUNTER — HOSPITAL ENCOUNTER (EMERGENCY)
Facility: HOSPITAL | Age: 23
Discharge: HOME | End: 2023-10-14
Attending: EMERGENCY MEDICINE
Payer: COMMERCIAL

## 2023-10-14 VITALS
SYSTOLIC BLOOD PRESSURE: 146 MMHG | HEART RATE: 82 BPM | RESPIRATION RATE: 16 BRPM | TEMPERATURE: 98 F | HEIGHT: 73 IN | DIASTOLIC BLOOD PRESSURE: 97 MMHG | BODY MASS INDEX: 41.75 KG/M2 | OXYGEN SATURATION: 97 % | WEIGHT: 315 LBS

## 2023-10-14 DIAGNOSIS — R19.7 DIARRHEA, UNSPECIFIED TYPE: Primary | ICD-10-CM

## 2023-10-14 LAB
ALBUMIN SERPL-MCNC: 4.5 G/DL (ref 3.5–5)
ALBUMIN/GLOB SERPL: 1.2 RATIO (ref 1.1–2)
ALP SERPL-CCNC: 58 UNIT/L (ref 40–150)
ALT SERPL-CCNC: 20 UNIT/L (ref 0–55)
AST SERPL-CCNC: 17 UNIT/L (ref 5–34)
BASOPHILS # BLD AUTO: 0.06 X10(3)/MCL
BASOPHILS NFR BLD AUTO: 0.8 %
BILIRUB SERPL-MCNC: 1.1 MG/DL
BUN SERPL-MCNC: 18 MG/DL (ref 8.9–20.6)
CALCIUM SERPL-MCNC: 9.4 MG/DL (ref 8.4–10.2)
CHLORIDE SERPL-SCNC: 106 MMOL/L (ref 98–107)
CLOSTRIDIUM DIFFICILE GDH ANTIGEN (OHS): NEGATIVE
CLOSTRIDIUM DIFFICILE TOXIN A/B (OHS): NEGATIVE
CO2 SERPL-SCNC: 20 MMOL/L (ref 22–29)
CREAT SERPL-MCNC: 1.34 MG/DL (ref 0.73–1.18)
EOSINOPHIL # BLD AUTO: 0.32 X10(3)/MCL (ref 0–0.9)
EOSINOPHIL NFR BLD AUTO: 4.4 %
ERYTHROCYTE [DISTWIDTH] IN BLOOD BY AUTOMATED COUNT: 12.5 % (ref 11.5–17)
GFR SERPLBLD CREATININE-BSD FMLA CKD-EPI: >60 MLS/MIN/1.73/M2
GLOBULIN SER-MCNC: 3.7 GM/DL (ref 2.4–3.5)
GLUCOSE SERPL-MCNC: 112 MG/DL (ref 74–100)
HCT VFR BLD AUTO: 46.7 % (ref 42–52)
HGB BLD-MCNC: 16.2 G/DL (ref 14–18)
IMM GRANULOCYTES # BLD AUTO: 0.02 X10(3)/MCL (ref 0–0.04)
IMM GRANULOCYTES NFR BLD AUTO: 0.3 %
LYMPHOCYTES # BLD AUTO: 1.11 X10(3)/MCL (ref 0.6–4.6)
LYMPHOCYTES NFR BLD AUTO: 15.4 %
MCH RBC QN AUTO: 30.6 PG (ref 27–31)
MCHC RBC AUTO-ENTMCNC: 34.7 G/DL (ref 33–36)
MCV RBC AUTO: 88.1 FL (ref 80–94)
MONOCYTES # BLD AUTO: 1.03 X10(3)/MCL (ref 0.1–1.3)
MONOCYTES NFR BLD AUTO: 14.3 %
NEUTROPHILS # BLD AUTO: 4.66 X10(3)/MCL (ref 2.1–9.2)
NEUTROPHILS NFR BLD AUTO: 64.8 %
NRBC BLD AUTO-RTO: 0 %
PLATELET # BLD AUTO: 263 X10(3)/MCL (ref 130–400)
PMV BLD AUTO: 9.5 FL (ref 7.4–10.4)
POTASSIUM SERPL-SCNC: 3.5 MMOL/L (ref 3.5–5.1)
PROT SERPL-MCNC: 8.2 GM/DL (ref 6.4–8.3)
RBC # BLD AUTO: 5.3 X10(6)/MCL (ref 4.7–6.1)
SODIUM SERPL-SCNC: 140 MMOL/L (ref 136–145)
WBC # SPEC AUTO: 7.2 X10(3)/MCL (ref 4.5–11.5)

## 2023-10-14 PROCEDURE — 86318 IA INFECTIOUS AGENT ANTIBODY: CPT | Performed by: EMERGENCY MEDICINE

## 2023-10-14 PROCEDURE — 85025 COMPLETE CBC W/AUTO DIFF WBC: CPT | Performed by: EMERGENCY MEDICINE

## 2023-10-14 PROCEDURE — 99284 EMERGENCY DEPT VISIT MOD MDM: CPT

## 2023-10-14 PROCEDURE — 80053 COMPREHEN METABOLIC PANEL: CPT | Performed by: EMERGENCY MEDICINE

## 2023-10-14 RX ORDER — DIPHENOXYLATE HYDROCHLORIDE AND ATROPINE SULFATE 2.5; .025 MG/1; MG/1
1 TABLET ORAL 4 TIMES DAILY PRN
Qty: 12 TABLET | Refills: 0 | Status: SHIPPED | OUTPATIENT
Start: 2023-10-14

## 2023-10-14 RX ORDER — ONDANSETRON 4 MG/1
4 TABLET, ORALLY DISINTEGRATING ORAL EVERY 6 HOURS PRN
Qty: 12 TABLET | Refills: 0 | Status: SHIPPED | OUTPATIENT
Start: 2023-10-14 | End: 2024-01-11

## 2023-10-14 RX ORDER — ONDANSETRON 4 MG/1
4 TABLET, ORALLY DISINTEGRATING ORAL EVERY 6 HOURS PRN
Qty: 12 TABLET | Refills: 0 | Status: SHIPPED | OUTPATIENT
Start: 2023-10-14 | End: 2023-10-14 | Stop reason: SDUPTHER

## 2023-10-14 NOTE — ED PROVIDER NOTES
Encounter Date: 10/14/2023       History     Chief Complaint   Patient presents with    Diarrhea     Pt reports having multiple episodes of loose stools that are [place] color .denies pain afebrile.      Patient is a 23-year-old male presenting with complaint of diarrhea since this past Wednesday.  Patient states on Thursday he had 1 bout of nausea and vomiting but denies any nausea currently.  Patient denies any fever chills.  Patient denies abdominal pain.      Review of patient's allergies indicates:  No Known Allergies  Past Medical History:   Diagnosis Date    Allergy      No past surgical history on file.  Family History   Problem Relation Age of Onset    No Known Problems Mother     No Known Problems Father      Social History     Tobacco Use    Smoking status: Never    Smokeless tobacco: Never   Substance Use Topics    Alcohol use: Not Currently    Drug use: Not Currently     Review of Systems   Constitutional: Negative.    HENT: Negative.     Respiratory: Negative.     Cardiovascular: Negative.    Gastrointestinal:  Positive for diarrhea. Negative for abdominal pain, anal bleeding, constipation, nausea and vomiting.   Genitourinary: Negative.    Musculoskeletal: Negative.    Neurological: Negative.    Psychiatric/Behavioral: Negative.         Physical Exam     Initial Vitals [10/14/23 0450]   BP Pulse Resp Temp SpO2   (!) 155/109 106 18 98.6 °F (37 °C) 96 %      MAP       --         Physical Exam    Nursing note and vitals reviewed.  Constitutional: He appears well-developed and well-nourished.   HENT:   Head: Normocephalic.   Neck: Neck supple.   Normal range of motion.  Cardiovascular:  Normal rate, regular rhythm and normal heart sounds.           Pulmonary/Chest: Breath sounds normal. No respiratory distress. He has no rhonchi.   Abdominal: Abdomen is soft. He exhibits no distension. There is no abdominal tenderness. There is no guarding.   Musculoskeletal:         General: Normal range of  motion.      Cervical back: Normal range of motion and neck supple.     Neurological: He is alert and oriented to person, place, and time. He has normal strength.   Skin: Skin is warm.   Psychiatric: He has a normal mood and affect. Thought content normal.         ED Course   Procedures  Labs Reviewed   COMPREHENSIVE METABOLIC PANEL - Abnormal; Notable for the following components:       Result Value    Carbon Dioxide 20 (*)     Glucose Level 112 (*)     Creatinine 1.34 (*)     Globulin 3.7 (*)     All other components within normal limits   CLOSTRIDIUM DIFFICILE TOXIN A AND B, EIA   CBC W/ AUTO DIFFERENTIAL    Narrative:     The following orders were created for panel order CBC auto differential.  Procedure                               Abnormality         Status                     ---------                               -----------         ------                     CBC with Differential[[id number]]                           Final result                 Please view results for these tests on the individual orders.   CBC WITH DIFFERENTIAL          Imaging Results    None          Medications - No data to display  Medical Decision Making  As per HPI.  Differential diagnosis: Food poisoning, acute diarrhea, dehydration, viral syndrome    Amount and/or Complexity of Data Reviewed  Labs: ordered.     Details: All labs are stable, bicarb is slightly depressed.  Discussion of management or test interpretation with external provider(s): Patient refused IV while in the ER.  Patient denies any nausea during his ER stay.  Patient did give us a stool specimen and will be sent for C diff testing.  Will discharge patient with prescription for Zofran and Lomotil.                               Clinical Impression:   Final diagnoses:  [R19.7] Diarrhea, unspecified type (Primary)        ED Disposition Condition    Discharge Stable          ED Prescriptions       Medication Sig Dispense Start Date End Date Auth. Provider     diphenoxylate-atropine 2.5-0.025 mg (LOMOTIL) 2.5-0.025 mg per tablet Take 1 tablet by mouth 4 (four) times daily as needed for Diarrhea. 12 tablet 10/14/2023 -- Yovanny Barone MD    ondansetron (ZOFRAN-ODT) 4 MG TbDL  (Status: Discontinued) Take 1 tablet (4 mg total) by mouth every 6 (six) hours as needed. 12 tablet 10/14/2023 10/14/2023 Yovanny Barone MD    ondansetron (ZOFRAN-ODT) 4 MG TbDL Take 1 tablet (4 mg total) by mouth every 6 (six) hours as needed. 12 tablet 10/14/2023 -- Yovanny Barone MD          Follow-up Information       Follow up With Specialties Details Why Contact Info    Omar Hudson MD Family Medicine In 3 days  [address] [facility] [address]  [phone]      [facility] Emergency Medicine  As needed, If symptoms worsen [address] [facility] [address] [phone]             Yovanny Barone MD  10/14/23 0668

## 2024-01-11 ENCOUNTER — OFFICE VISIT (OUTPATIENT)
Dept: FAMILY MEDICINE | Facility: CLINIC | Age: 24
End: 2024-01-11
Payer: COMMERCIAL

## 2024-01-11 VITALS
OXYGEN SATURATION: 98 % | RESPIRATION RATE: 18 BRPM | SYSTOLIC BLOOD PRESSURE: 136 MMHG | WEIGHT: 315 LBS | HEIGHT: 73 IN | DIASTOLIC BLOOD PRESSURE: 86 MMHG | BODY MASS INDEX: 41.75 KG/M2 | TEMPERATURE: 97 F | HEART RATE: 108 BPM

## 2024-01-11 DIAGNOSIS — E66.01 CLASS 3 OBESITY: ICD-10-CM

## 2024-01-11 DIAGNOSIS — F33.9 DEPRESSION, RECURRENT: ICD-10-CM

## 2024-01-11 DIAGNOSIS — I10 HYPERTENSION, UNSPECIFIED TYPE: Primary | ICD-10-CM

## 2024-01-11 PROCEDURE — 3008F BODY MASS INDEX DOCD: CPT | Mod: CPTII,,, | Performed by: FAMILY MEDICINE

## 2024-01-11 PROCEDURE — 3079F DIAST BP 80-89 MM HG: CPT | Mod: CPTII,,, | Performed by: FAMILY MEDICINE

## 2024-01-11 PROCEDURE — 99214 OFFICE O/P EST MOD 30 MIN: CPT | Mod: ,,, | Performed by: FAMILY MEDICINE

## 2024-01-11 PROCEDURE — 1160F RVW MEDS BY RX/DR IN RCRD: CPT | Mod: CPTII,,, | Performed by: FAMILY MEDICINE

## 2024-01-11 PROCEDURE — 1159F MED LIST DOCD IN RCRD: CPT | Mod: CPTII,,, | Performed by: FAMILY MEDICINE

## 2024-01-11 PROCEDURE — 3075F SYST BP GE 130 - 139MM HG: CPT | Mod: CPTII,,, | Performed by: FAMILY MEDICINE

## 2024-01-11 RX ORDER — PHENTERMINE HYDROCHLORIDE 37.5 MG/1
37.5 TABLET ORAL
Qty: 30 TABLET | Refills: 0 | Status: SHIPPED | OUTPATIENT
Start: 2024-01-11 | End: 2024-02-10

## 2024-01-11 NOTE — ASSESSMENT & PLAN NOTE
Reduced calorie diet modification  Frequent self weighing   Exercise/lifestyle modification  Rx for Adipex

## 2024-01-11 NOTE — PROGRESS NOTES
"Subjective:       Patient ID: Roney Fuller is a 23 y.o. male.    Chief Complaint: 3 MONTH FOLLOW UP      Routine      Hypertension  - tolerating medication (no side effects)  - no headaches  - patient without any complaints    Review of Systems   Constitutional: Negative.    Respiratory: Negative.     Cardiovascular: Negative.    Gastrointestinal: Negative.    Psychiatric/Behavioral: Negative.          Depression: currently not taking any medication, patient without any complaints           Objective:      /86 (BP Location: Left arm, Patient Position: Sitting, BP Method: Large (Manual))   Pulse 108   Temp 97.3 °F (36.3 °C)   Resp 18   Ht 6' 1" (1.854 m)   Wt (!) 145.6 kg (321 lb)   SpO2 98%   BMI 42.35 kg/m²    Physical Exam  Constitutional:       Appearance: Normal appearance.   Cardiovascular:      Rate and Rhythm: Normal rate and regular rhythm.      Heart sounds: Normal heart sounds.   Pulmonary:      Effort: Pulmonary effort is normal.      Breath sounds: Normal breath sounds.   Neurological:      Mental Status: He is alert.   Psychiatric:         Mood and Affect: Mood normal.         Behavior: Behavior normal.         Thought Content: Thought content normal.         Judgment: Judgment normal.               Assessment:       Problem List Items Addressed This Visit          Psychiatric    Depression, recurrent    Current Assessment & Plan     Currently not taking any medication  Patient without any complaints            Cardiac/Vascular    Hypertension - Primary       Endocrine    Class 3 obesity    Current Assessment & Plan     Reduced calorie diet modification  Frequent self weighing   Exercise/lifestyle modification  Rx for Adipex         Relevant Medications    phentermine (ADIPEX-P) 37.5 mg tablet          Plan:   1. Hypertension, unspecified type  Controlled  Continue current Rx medication  Return to clinic with any concerns    2. Depression, recurrent  Assessment & Plan:  Currently not " taking any medication  Patient without any complaints    3. Class 3 obesity  Assessment & Plan:  Reduced calorie diet modification  Frequent self weighing   Exercise/lifestyle modification  Rx for Adipex  Orders:  -     phentermine (ADIPEX-P) 37.5 mg tablet; Take 1 tablet (37.5 mg total) by mouth before breakfast.  Dispense: 30 tablet; Refill: 0

## 2024-02-03 DIAGNOSIS — I10 HYPERTENSION, UNSPECIFIED TYPE: ICD-10-CM

## 2024-02-05 RX ORDER — HYDROCHLOROTHIAZIDE 25 MG/1
25 TABLET ORAL
Qty: 90 TABLET | Refills: 1 | Status: SHIPPED | OUTPATIENT
Start: 2024-02-05

## 2024-07-30 DIAGNOSIS — I10 HYPERTENSION, UNSPECIFIED TYPE: ICD-10-CM

## 2024-07-30 RX ORDER — HYDROCHLOROTHIAZIDE 25 MG/1
25 TABLET ORAL
Qty: 90 TABLET | Refills: 1 | Status: SHIPPED | OUTPATIENT
Start: 2024-07-30

## 2024-08-12 DIAGNOSIS — Z11.59 NEED FOR HEPATITIS C SCREENING TEST: ICD-10-CM

## 2024-08-12 DIAGNOSIS — I10 HYPERTENSION, UNSPECIFIED TYPE: Primary | ICD-10-CM

## 2024-08-12 DIAGNOSIS — Z11.4 ENCOUNTER FOR HIV (HUMAN IMMUNODEFICIENCY VIRUS) TEST: ICD-10-CM

## 2024-08-12 DIAGNOSIS — Z00.00 WELLNESS EXAMINATION: ICD-10-CM

## 2024-08-12 DIAGNOSIS — E78.5 HYPERLIPIDEMIA, UNSPECIFIED HYPERLIPIDEMIA TYPE: ICD-10-CM

## 2024-09-09 ENCOUNTER — LAB VISIT (OUTPATIENT)
Dept: LAB | Facility: HOSPITAL | Age: 24
End: 2024-09-09
Attending: FAMILY MEDICINE
Payer: COMMERCIAL

## 2024-09-09 DIAGNOSIS — Z00.00 WELLNESS EXAMINATION: ICD-10-CM

## 2024-09-09 DIAGNOSIS — Z11.59 NEED FOR HEPATITIS C SCREENING TEST: ICD-10-CM

## 2024-09-09 DIAGNOSIS — I10 HYPERTENSION, UNSPECIFIED TYPE: ICD-10-CM

## 2024-09-09 DIAGNOSIS — E78.5 HYPERLIPIDEMIA, UNSPECIFIED HYPERLIPIDEMIA TYPE: ICD-10-CM

## 2024-09-09 DIAGNOSIS — Z11.4 ENCOUNTER FOR HIV (HUMAN IMMUNODEFICIENCY VIRUS) TEST: ICD-10-CM

## 2024-09-09 LAB
ALBUMIN SERPL-MCNC: 4.2 G/DL (ref 3.5–5)
ALBUMIN/GLOB SERPL: 1.2 RATIO (ref 1.1–2)
ALP SERPL-CCNC: 52 UNIT/L (ref 40–150)
ALT SERPL-CCNC: 24 UNIT/L (ref 0–55)
ANION GAP SERPL CALC-SCNC: 10 MEQ/L
AST SERPL-CCNC: 17 UNIT/L (ref 5–34)
BASOPHILS # BLD AUTO: 0.08 X10(3)/MCL
BASOPHILS NFR BLD AUTO: 1.2 %
BILIRUB SERPL-MCNC: 0.9 MG/DL
BUN SERPL-MCNC: 22 MG/DL (ref 8.9–20.6)
CALCIUM SERPL-MCNC: 9.8 MG/DL (ref 8.4–10.2)
CHLORIDE SERPL-SCNC: 105 MMOL/L (ref 98–107)
CHOLEST SERPL-MCNC: 191 MG/DL
CHOLEST/HDLC SERPL: 5 {RATIO} (ref 0–5)
CO2 SERPL-SCNC: 27 MMOL/L (ref 22–29)
CREAT SERPL-MCNC: 1.27 MG/DL (ref 0.73–1.18)
CREAT/UREA NIT SERPL: 17
EOSINOPHIL # BLD AUTO: 0.22 X10(3)/MCL (ref 0–0.9)
EOSINOPHIL NFR BLD AUTO: 3.2 %
ERYTHROCYTE [DISTWIDTH] IN BLOOD BY AUTOMATED COUNT: 12.7 % (ref 11.5–17)
GFR SERPLBLD CREATININE-BSD FMLA CKD-EPI: >60 ML/MIN/1.73/M2
GLOBULIN SER-MCNC: 3.5 GM/DL (ref 2.4–3.5)
GLUCOSE SERPL-MCNC: 104 MG/DL (ref 74–100)
HCT VFR BLD AUTO: 45.1 % (ref 42–52)
HCV AB SERPL QL IA: NONREACTIVE
HDLC SERPL-MCNC: 39 MG/DL (ref 35–60)
HGB BLD-MCNC: 14.8 G/DL (ref 14–18)
HIV 1+2 AB+HIV1 P24 AG SERPL QL IA: NONREACTIVE
IMM GRANULOCYTES # BLD AUTO: 0.03 X10(3)/MCL (ref 0–0.04)
IMM GRANULOCYTES NFR BLD AUTO: 0.4 %
LDLC SERPL CALC-MCNC: 108 MG/DL (ref 50–140)
LYMPHOCYTES # BLD AUTO: 1.77 X10(3)/MCL (ref 0.6–4.6)
LYMPHOCYTES NFR BLD AUTO: 25.7 %
MCH RBC QN AUTO: 29.3 PG (ref 27–31)
MCHC RBC AUTO-ENTMCNC: 32.8 G/DL (ref 33–36)
MCV RBC AUTO: 89.3 FL (ref 80–94)
MONOCYTES # BLD AUTO: 0.66 X10(3)/MCL (ref 0.1–1.3)
MONOCYTES NFR BLD AUTO: 9.6 %
NEUTROPHILS # BLD AUTO: 4.14 X10(3)/MCL (ref 2.1–9.2)
NEUTROPHILS NFR BLD AUTO: 59.9 %
NRBC BLD AUTO-RTO: 0 %
PLATELET # BLD AUTO: 313 X10(3)/MCL (ref 130–400)
PMV BLD AUTO: 9.6 FL (ref 7.4–10.4)
POTASSIUM SERPL-SCNC: 4 MMOL/L (ref 3.5–5.1)
PROT SERPL-MCNC: 7.7 GM/DL (ref 6.4–8.3)
RBC # BLD AUTO: 5.05 X10(6)/MCL (ref 4.7–6.1)
SODIUM SERPL-SCNC: 142 MMOL/L (ref 136–145)
TRIGL SERPL-MCNC: 221 MG/DL (ref 34–140)
VLDLC SERPL CALC-MCNC: 44 MG/DL
WBC # BLD AUTO: 6.9 X10(3)/MCL (ref 4.5–11.5)

## 2024-09-09 PROCEDURE — 86803 HEPATITIS C AB TEST: CPT

## 2024-09-09 PROCEDURE — 87389 HIV-1 AG W/HIV-1&-2 AB AG IA: CPT

## 2024-09-09 PROCEDURE — 80053 COMPREHEN METABOLIC PANEL: CPT

## 2024-09-09 PROCEDURE — 85025 COMPLETE CBC W/AUTO DIFF WBC: CPT

## 2024-09-09 PROCEDURE — 80061 LIPID PANEL: CPT

## 2024-09-09 PROCEDURE — 36415 COLL VENOUS BLD VENIPUNCTURE: CPT

## 2024-09-12 ENCOUNTER — OFFICE VISIT (OUTPATIENT)
Dept: FAMILY MEDICINE | Facility: CLINIC | Age: 24
End: 2024-09-12
Payer: COMMERCIAL

## 2024-09-12 VITALS
SYSTOLIC BLOOD PRESSURE: 134 MMHG | HEIGHT: 73 IN | HEART RATE: 85 BPM | WEIGHT: 315 LBS | OXYGEN SATURATION: 98 % | RESPIRATION RATE: 18 BRPM | TEMPERATURE: 97 F | DIASTOLIC BLOOD PRESSURE: 86 MMHG | BODY MASS INDEX: 41.75 KG/M2

## 2024-09-12 DIAGNOSIS — Z00.00 WELLNESS EXAMINATION: Primary | ICD-10-CM

## 2024-09-12 DIAGNOSIS — E66.01 CLASS 3 OBESITY: ICD-10-CM

## 2024-09-12 PROCEDURE — 99395 PREV VISIT EST AGE 18-39: CPT | Mod: ,,, | Performed by: FAMILY MEDICINE

## 2024-09-12 PROCEDURE — 1159F MED LIST DOCD IN RCRD: CPT | Mod: CPTII,,, | Performed by: FAMILY MEDICINE

## 2024-09-12 PROCEDURE — 3075F SYST BP GE 130 - 139MM HG: CPT | Mod: CPTII,,, | Performed by: FAMILY MEDICINE

## 2024-09-12 PROCEDURE — 3008F BODY MASS INDEX DOCD: CPT | Mod: CPTII,,, | Performed by: FAMILY MEDICINE

## 2024-09-12 PROCEDURE — 1160F RVW MEDS BY RX/DR IN RCRD: CPT | Mod: CPTII,,, | Performed by: FAMILY MEDICINE

## 2024-09-12 PROCEDURE — 3079F DIAST BP 80-89 MM HG: CPT | Mod: CPTII,,, | Performed by: FAMILY MEDICINE

## 2024-09-12 RX ORDER — TIRZEPATIDE 2.5 MG/.5ML
2.5 INJECTION, SOLUTION SUBCUTANEOUS
Qty: 2 ML | Refills: 1 | Status: SHIPPED | OUTPATIENT
Start: 2024-09-12

## 2024-09-12 RX ORDER — SEMAGLUTIDE 0.25 MG/.5ML
0.25 INJECTION, SOLUTION SUBCUTANEOUS
Qty: 2 ML | Refills: 3 | Status: SHIPPED | OUTPATIENT
Start: 2024-09-12 | End: 2024-09-12

## 2024-09-12 RX ORDER — SEMAGLUTIDE 0.25 MG/.5ML
0.25 INJECTION, SOLUTION SUBCUTANEOUS
Refills: 3 | OUTPATIENT
Start: 2024-09-12 | End: 2025-01-10

## 2024-09-12 NOTE — PROGRESS NOTES
"Subjective:      Patient ID: Roney Fuller is a 24 y.o. male.    Chief Complaint: Annual Exam      Wellness        Review of Systems   Constitutional: Negative.    Respiratory: Negative.     Cardiovascular: Negative.    Gastrointestinal: Negative.    Psychiatric/Behavioral: Negative.           Objective:     /86   Pulse 85   Temp 97.1 °F (36.2 °C) (Temporal)   Resp 18   Ht 6' 0.84" (1.85 m)   Wt (!) 151 kg (333 lb)   SpO2 98%   BMI 44.13 kg/m²    Physical Exam  Constitutional:       Appearance: Normal appearance.   Cardiovascular:      Rate and Rhythm: Normal rate and regular rhythm.   Pulmonary:      Effort: Pulmonary effort is normal.      Breath sounds: Normal breath sounds.   Abdominal:      General: Abdomen is flat. Bowel sounds are normal.      Palpations: Abdomen is soft.   Neurological:      Mental Status: He is alert.   Psychiatric:         Mood and Affect: Mood normal.         Behavior: Behavior normal.         Thought Content: Thought content normal.         Judgment: Judgment normal.       Recent Results (from the past 504 hour(s))   Lipid Panel    Collection Time: 09/09/24  8:34 AM   Result Value Ref Range    Cholesterol Total 191 <=200 mg/dL    HDL Cholesterol 39 35 - 60 mg/dL    Triglyceride 221 (H) 34 - 140 mg/dL    Cholesterol/HDL Ratio 5 0 - 5    Very Low Density Lipoprotein 44     LDL Cholesterol 108.00 50.00 - 140.00 mg/dL   Comprehensive Metabolic Panel    Collection Time: 09/09/24  8:34 AM   Result Value Ref Range    Sodium 142 136 - 145 mmol/L    Potassium 4.0 3.5 - 5.1 mmol/L    Chloride 105 98 - 107 mmol/L    CO2 27 22 - 29 mmol/L    Glucose 104 (H) 74 - 100 mg/dL    Blood Urea Nitrogen 22.0 (H) 8.9 - 20.6 mg/dL    Creatinine 1.27 (H) 0.73 - 1.18 mg/dL    Calcium 9.8 8.4 - 10.2 mg/dL    Protein Total 7.7 6.4 - 8.3 gm/dL    Albumin 4.2 3.5 - 5.0 g/dL    Globulin 3.5 2.4 - 3.5 gm/dL    Albumin/Globulin Ratio 1.2 1.1 - 2.0 ratio    Bilirubin Total 0.9 <=1.5 mg/dL    ALP 52 40 - " 150 unit/L    ALT 24 0 - 55 unit/L    AST 17 5 - 34 unit/L    eGFR >60 mL/min/1.73/m2    Anion Gap 10.0 mEq/L    BUN/Creatinine Ratio 17    Hepatitis C Antibody    Collection Time: 09/09/24  8:34 AM   Result Value Ref Range    Hep C Ab Interp Nonreactive Nonreactive   HIV 1/2 Ag/Ab (4th Gen)    Collection Time: 09/09/24  8:34 AM   Result Value Ref Range    HIV Nonreactive Nonreactive   CBC with Differential    Collection Time: 09/09/24  8:34 AM   Result Value Ref Range    WBC 6.90 4.50 - 11.50 x10(3)/mcL    RBC 5.05 4.70 - 6.10 x10(6)/mcL    Hgb 14.8 14.0 - 18.0 g/dL    Hct 45.1 42.0 - 52.0 %    MCV 89.3 80.0 - 94.0 fL    MCH 29.3 27.0 - 31.0 pg    MCHC 32.8 (L) 33.0 - 36.0 g/dL    RDW 12.7 11.5 - 17.0 %    Platelet 313 130 - 400 x10(3)/mcL    MPV 9.6 7.4 - 10.4 fL    Neut % 59.9 %    Lymph % 25.7 %    Mono % 9.6 %    Eos % 3.2 %    Basophil % 1.2 %    Lymph # 1.77 0.6 - 4.6 x10(3)/mcL    Neut # 4.14 2.1 - 9.2 x10(3)/mcL    Mono # 0.66 0.1 - 1.3 x10(3)/mcL    Eos # 0.22 0 - 0.9 x10(3)/mcL    Baso # 0.08 <=0.2 x10(3)/mcL    IG# 0.03 0 - 0.04 x10(3)/mcL    IG% 0.4 %    NRBC% 0.0 %            Assessment:     Problem List Items Addressed This Visit          Endocrine    Class 3 obesity    Relevant Medications    semaglutide, weight loss, (WEGOVY) 0.25 mg/0.5 mL PnIj     Other Visit Diagnoses       Wellness examination    -  Primary             Plan:   1. Wellness examination  Lab work discussed with patient  Continue current medication  Continue diet/exercise  Return to clinic with any concerns    2. Class 3 obesity  -     semaglutide, weight loss, (WEGOVY) 0.25 mg/0.5 mL PnIj; Inject 0.25 mg into the skin every 7 days.  Dispense: 2 mL; Refill: 3  Reduced calorie diet modification  Frequent self weighing   Exercise/lifestyle modification  Rx for Wegovy  Return to clinic with any concerns

## 2024-09-17 RX ORDER — TIRZEPATIDE 2.5 MG/.5ML
INJECTION, SOLUTION SUBCUTANEOUS
Qty: 4 PEN | Refills: 2 | Status: SHIPPED | OUTPATIENT
Start: 2024-09-17

## 2024-09-20 ENCOUNTER — TELEPHONE (OUTPATIENT)
Dept: FAMILY MEDICINE | Facility: CLINIC | Age: 24
End: 2024-09-20
Payer: COMMERCIAL

## 2024-09-20 NOTE — TELEPHONE ENCOUNTER
----- Message from Lyla He sent at 9/20/2024  9:53 AM CDT -----  Regarding: med question  Questions about meds beings sent back for PA     [phone]

## 2024-12-19 ENCOUNTER — OFFICE VISIT (OUTPATIENT)
Dept: FAMILY MEDICINE | Facility: CLINIC | Age: 24
End: 2024-12-19
Payer: COMMERCIAL

## 2024-12-19 VITALS
WEIGHT: 315 LBS | SYSTOLIC BLOOD PRESSURE: 130 MMHG | DIASTOLIC BLOOD PRESSURE: 82 MMHG | HEART RATE: 95 BPM | BODY MASS INDEX: 42.66 KG/M2 | RESPIRATION RATE: 18 BRPM | HEIGHT: 72 IN | OXYGEN SATURATION: 99 % | TEMPERATURE: 97 F

## 2024-12-19 DIAGNOSIS — E66.813 CLASS 3 OBESITY: ICD-10-CM

## 2024-12-19 DIAGNOSIS — I10 HYPERTENSION, UNSPECIFIED TYPE: ICD-10-CM

## 2024-12-19 DIAGNOSIS — I10 HYPERTENSION, UNSPECIFIED TYPE: Primary | ICD-10-CM

## 2024-12-19 DIAGNOSIS — E78.5 HYPERLIPIDEMIA, UNSPECIFIED HYPERLIPIDEMIA TYPE: ICD-10-CM

## 2024-12-19 PROCEDURE — 3008F BODY MASS INDEX DOCD: CPT | Mod: CPTII,,, | Performed by: FAMILY MEDICINE

## 2024-12-19 PROCEDURE — 3075F SYST BP GE 130 - 139MM HG: CPT | Mod: CPTII,,, | Performed by: FAMILY MEDICINE

## 2024-12-19 PROCEDURE — 99214 OFFICE O/P EST MOD 30 MIN: CPT | Mod: ,,, | Performed by: FAMILY MEDICINE

## 2024-12-19 PROCEDURE — 1160F RVW MEDS BY RX/DR IN RCRD: CPT | Mod: CPTII,,, | Performed by: FAMILY MEDICINE

## 2024-12-19 PROCEDURE — 1159F MED LIST DOCD IN RCRD: CPT | Mod: CPTII,,, | Performed by: FAMILY MEDICINE

## 2024-12-19 PROCEDURE — 3079F DIAST BP 80-89 MM HG: CPT | Mod: CPTII,,, | Performed by: FAMILY MEDICINE

## 2024-12-19 RX ORDER — TIRZEPATIDE 2.5 MG/.5ML
INJECTION, SOLUTION SUBCUTANEOUS
Qty: 4 PEN | Refills: 1 | Status: SHIPPED | OUTPATIENT
Start: 2024-12-19 | End: 2024-12-20

## 2024-12-19 RX ORDER — TIRZEPATIDE 2.5 MG/.5ML
2.5 INJECTION, SOLUTION SUBCUTANEOUS
Qty: 4 PEN | Refills: 1 | Status: SHIPPED | OUTPATIENT
Start: 2024-12-19 | End: 2024-12-19

## 2024-12-19 RX ORDER — PHENTERMINE HYDROCHLORIDE 37.5 MG/1
37.5 TABLET ORAL
Qty: 30 TABLET | Refills: 0 | Status: SHIPPED | OUTPATIENT
Start: 2024-12-19 | End: 2025-01-18

## 2024-12-19 RX ORDER — HYDROCHLOROTHIAZIDE 25 MG/1
25 TABLET ORAL DAILY
Qty: 90 TABLET | Refills: 3 | Status: SHIPPED | OUTPATIENT
Start: 2024-12-19 | End: 2025-12-19

## 2024-12-19 NOTE — PROGRESS NOTES
Subjective:      Patient ID: Roney Fuller is a 24 y.o. male.    Chief Complaint: 3 month f/u      HPI    Hypertension  - tolerating medication (no side effects)  - no headaches  - patient without any complaints    Review of Systems   Constitutional:  Positive for unexpected weight change (Unable to fill Zepbound in the past).   Respiratory: Negative.     Cardiovascular: Negative.    Gastrointestinal: Negative.    Psychiatric/Behavioral: Negative.           Objective:     /82 (BP Location: Left arm, Patient Position: Sitting)   Pulse 95   Temp 97 °F (36.1 °C)   Resp 18   Ht 6' (1.829 m)   Wt (!) 150.1 kg (331 lb)   SpO2 99%   BMI 44.89 kg/m²    Physical Exam  Constitutional:       Appearance: Normal appearance.   Cardiovascular:      Rate and Rhythm: Normal rate and regular rhythm.      Heart sounds: Normal heart sounds.   Pulmonary:      Effort: Pulmonary effort is normal.      Breath sounds: Normal breath sounds.   Neurological:      Mental Status: He is alert.   Psychiatric:         Mood and Affect: Mood normal.         Behavior: Behavior normal.         Thought Content: Thought content normal.         Judgment: Judgment normal.             Assessment:     Problem List Items Addressed This Visit          Cardiac/Vascular    Hypertension - Primary    Relevant Medications    hydroCHLOROthiazide (HYDRODIURIL) 25 MG tablet       Endocrine    Class 3 obesity    Relevant Medications    phentermine (ADIPEX-P) 37.5 mg tablet        Plan:   1. Hypertension, unspecified type  -     hydroCHLOROthiazide (HYDRODIURIL) 25 MG tablet; Take 1 tablet (25 mg total) by mouth once daily.  Dispense: 90 tablet; Refill: 3  Controlled  Continue current Rx medication  Return to clinic with any concerns    2. Class 3 obesity  -     phentermine (ADIPEX-P) 37.5 mg tablet; Take 1 tablet (37.5 mg total) by mouth before breakfast.  Dispense: 30 tablet; Refill: 0  Reduced calorie diet modification  Frequent self weighing    Exercise/lifestyle modification  Rx for Adipex   Discussed Rx for Zepbound; we will try to get PA  Return to clinic with any concerns

## 2024-12-20 RX ORDER — TIRZEPATIDE 2.5 MG/.5ML
INJECTION, SOLUTION SUBCUTANEOUS
Qty: 2 PEN | Refills: 1 | Status: SHIPPED | OUTPATIENT
Start: 2024-12-20

## 2025-02-06 ENCOUNTER — OFFICE VISIT (OUTPATIENT)
Dept: FAMILY MEDICINE | Facility: CLINIC | Age: 25
End: 2025-02-06
Payer: COMMERCIAL

## 2025-02-06 VITALS
DIASTOLIC BLOOD PRESSURE: 82 MMHG | RESPIRATION RATE: 18 BRPM | SYSTOLIC BLOOD PRESSURE: 120 MMHG | HEIGHT: 72 IN | BODY MASS INDEX: 42.66 KG/M2 | TEMPERATURE: 97 F | WEIGHT: 315 LBS | HEART RATE: 102 BPM | OXYGEN SATURATION: 97 %

## 2025-02-06 DIAGNOSIS — E66.01 MORBID (SEVERE) OBESITY DUE TO EXCESS CALORIES: Primary | ICD-10-CM

## 2025-02-06 DIAGNOSIS — J30.2 SEASONAL ALLERGIC RHINITIS, UNSPECIFIED TRIGGER: ICD-10-CM

## 2025-02-06 PROCEDURE — 1159F MED LIST DOCD IN RCRD: CPT | Mod: CPTII,,, | Performed by: FAMILY MEDICINE

## 2025-02-06 PROCEDURE — 3074F SYST BP LT 130 MM HG: CPT | Mod: CPTII,,, | Performed by: FAMILY MEDICINE

## 2025-02-06 PROCEDURE — 3079F DIAST BP 80-89 MM HG: CPT | Mod: CPTII,,, | Performed by: FAMILY MEDICINE

## 2025-02-06 PROCEDURE — 3008F BODY MASS INDEX DOCD: CPT | Mod: CPTII,,, | Performed by: FAMILY MEDICINE

## 2025-02-06 PROCEDURE — 1160F RVW MEDS BY RX/DR IN RCRD: CPT | Mod: CPTII,,, | Performed by: FAMILY MEDICINE

## 2025-02-06 PROCEDURE — 99214 OFFICE O/P EST MOD 30 MIN: CPT | Mod: ,,, | Performed by: FAMILY MEDICINE

## 2025-02-06 RX ORDER — MONTELUKAST SODIUM 10 MG/1
10 TABLET ORAL NIGHTLY
Qty: 30 TABLET | Refills: 5 | Status: SHIPPED | OUTPATIENT
Start: 2025-02-06 | End: 2025-08-05

## 2025-02-06 NOTE — PROGRESS NOTES
"Subjective:     Patient ID: Roney Fuller is a 24 y.o. male.    Chief Complaint: 1 month f/u        History of Present Illness    CHIEF COMPLAINT:  Patient presents today for follow up.    ALLERGIC RHINITIS:  He reports persistent sinus drip and year-round allergies. He currently takes generic allergy medication 50 mg twice daily. He previously took Singulair with reported improvement but experienced symptom rebound one week after discontinuation. Flonase use provided no benefit.    DIET AND NUTRITION:  He attempted a carnivore diet for one week which resulted in pain, likely due to insufficient fiber intake. After discontinuing, he experienced decreased appetite. His current diet consists primarily of a banana for breakfast and coffee. He eliminated soda from his diet three months ago.     MEDICATIONS:  He completed a course of Adipex, initially taking it every other day for approximately 1.5-2 weeks, then transitioning to daily use until completion.      ROS:  General: +loss of appetite  ENT: +nasal congestion  Cardiovascular: +chest pain        Review of Systems   Respiratory: Negative.     Gastrointestinal: Negative.            Objective:     /82   Pulse 102   Temp 97.1 °F (36.2 °C) (Temporal)   Resp 18   Ht 5' 11.65" (1.82 m)   Wt (!) 144.7 kg (319 lb)   SpO2 97%   BMI 43.68 kg/m²    Physical Exam  Constitutional:       Appearance: Normal appearance.   Cardiovascular:      Rate and Rhythm: Normal rate and regular rhythm.      Heart sounds: Normal heart sounds.   Pulmonary:      Effort: Pulmonary effort is normal.      Breath sounds: Normal breath sounds.   Neurological:      Mental Status: He is alert.   Psychiatric:         Mood and Affect: Mood normal.         Behavior: Behavior normal.         Thought Content: Thought content normal.         Judgment: Judgment normal.             Assessment:     Problem List Items Addressed This Visit          ENT    Seasonal allergic rhinitis    Relevant " Medications    montelukast (SINGULAIR) 10 mg tablet       Endocrine    Morbid (severe) obesity due to excess calories - Primary    Current Assessment & Plan     Reduced calorie diet modification  Frequent self weighing   Exercise/lifestyle modification                 Plan:   1. Morbid (severe) obesity due to excess calories  Assessment & Plan:  Reduced calorie diet modification  Frequent self weighing   Exercise/lifestyle modification    2. Seasonal allergic rhinitis, unspecified trigger  -     montelukast (SINGULAIR) 10 mg tablet; Take 1 tablet (10 mg total) by mouth every evening.  Dispense: 30 tablet; Refill: 5  Rx for Singulair 10 mg q.p.m.   OTC antihistamines q.a.m.  Avoid triggers   Monitor   Return to clinic with any concerns                 This note was generated with the assistance of ambient listening technology. Verbal consent was obtained by the patient and accompanying visitor(s) for the recording of patient appointment to facilitate this note. I attest to having reviewed and edited the generated note for accuracy, though some syntax or spelling errors may persist. Please contact the author of this note for any clarification.

## 2025-07-26 DIAGNOSIS — J30.2 SEASONAL ALLERGIC RHINITIS, UNSPECIFIED TRIGGER: ICD-10-CM

## 2025-07-28 RX ORDER — MONTELUKAST SODIUM 10 MG/1
10 TABLET ORAL NIGHTLY
Qty: 90 TABLET | Refills: 1 | Status: SHIPPED | OUTPATIENT
Start: 2025-07-28

## 2025-08-25 DIAGNOSIS — I10 HYPERTENSION, UNSPECIFIED TYPE: ICD-10-CM

## 2025-08-25 DIAGNOSIS — Z00.00 WELLNESS EXAMINATION: Primary | ICD-10-CM
